# Patient Record
Sex: MALE | Race: WHITE | NOT HISPANIC OR LATINO | Employment: FULL TIME | URBAN - METROPOLITAN AREA
[De-identification: names, ages, dates, MRNs, and addresses within clinical notes are randomized per-mention and may not be internally consistent; named-entity substitution may affect disease eponyms.]

---

## 2022-06-30 ENCOUNTER — LAB REQUISITION (OUTPATIENT)
Dept: LAB | Facility: CLINIC | Age: 32
End: 2022-06-30

## 2022-06-30 PROCEDURE — 86481 TB AG RESPONSE T-CELL SUSP: CPT | Performed by: INTERNAL MEDICINE

## 2022-07-02 LAB
GAMMA INTERFERON BACKGROUND BLD IA-ACNC: 0 IU/ML
M TB IFN-G BLD-IMP: NEGATIVE
M TB IFN-G CD4+ BCKGRND COR BLD-ACNC: 10 IU/ML
MITOGEN IGNF BCKGRD COR BLD-ACNC: 0.03 IU/ML
MITOGEN IGNF BCKGRD COR BLD-ACNC: 0.05 IU/ML
QUANTIFERON MITOGEN: 10 IU/ML
QUANTIFERON NIL TUBE: 0 IU/ML
QUANTIFERON TB1 TUBE: 0.05 IU/ML
QUANTIFERON TB2 TUBE: 0.03

## 2022-07-27 ENCOUNTER — VIRTUAL VISIT (OUTPATIENT)
Dept: FAMILY MEDICINE | Facility: CLINIC | Age: 32
End: 2022-07-27
Payer: COMMERCIAL

## 2022-07-27 DIAGNOSIS — Z72.51 HIGH RISK SEXUAL BEHAVIOR, UNSPECIFIED TYPE: ICD-10-CM

## 2022-07-27 DIAGNOSIS — M25.542 ARTHRALGIA OF BOTH HANDS: Primary | ICD-10-CM

## 2022-07-27 DIAGNOSIS — M25.541 ARTHRALGIA OF BOTH HANDS: Primary | ICD-10-CM

## 2022-07-27 DIAGNOSIS — M76.60 ACHILLES TENDON PAIN: ICD-10-CM

## 2022-07-27 PROCEDURE — 99204 OFFICE O/P NEW MOD 45 MIN: CPT | Mod: 95 | Performed by: INTERNAL MEDICINE

## 2022-07-27 NOTE — PROGRESS NOTES
Leonila is a 32 year old who is being evaluated via a billable video visit.      How would you like to obtain your AVS? MyChart  If the video visit is dropped, the invitation should be resent by: Text to cell phone: [phone]  Will anyone else be joining your video visit? No        Assessment & Plan     Arthralgia of both hands  Check for markers of inflammatory arthritis  If work up negative, consider hand surgery referral   Use topical voltaren for symptoms in interim   - Rheumatoid factor; Future  - Cyclic Citrullinated Peptide Antibody IgG; Future  - ESR: Erythrocyte sedimentation rate; Future  - CRP, inflammation; Future  - Comprehensive metabolic panel (BMP + Alb, Alk Phos, ALT, AST, Total. Bili, TP); Future  - CBC with platelets; Future    Achilles tendon pain  referral to sports medicine     High risk sexual behavior, unspecified type  Check labs and restart Truvada pending review  - Comprehensive metabolic panel (BMP + Alb, Alk Phos, ALT, AST, Total. Bili, TP); Future  - HIV Antigen Antibody Combo; Future  - Hepatitis C Screen Reflex to HCV RNA Quant and Genotype; Future  - Treponema Abs w Reflex to RPR and Titer; Future  - NEISSERIA GONORRHOEA PCR; Future  - CHLAMYDIA TRACHOMATIS PCR; Future  - Orthopedic  Referral; Future      35 minutes spent on the date of the encounter doing chart review, history and exam, documentation and further activities per the note           No follow-ups on file.    Sergey Hardin MD  [facility] NELL Keen is a 32 year old, presenting for the following health issues:  Pain      History of Present Illness       Reason for visit:  Pain in finger joints  Symptom onset:  More than a month  Symptoms include:  Episodic joint pain  Symptom intensity:  Mild  Symptom progression:  Staying the same  Had these symptoms before:  No    He eats 2-3 servings of fruits and vegetables daily.He consumes 0 sweetened beverage(s) daily.He exercises with  enough effort to increase his heart rate 30 to 60 minutes per day.  He exercises with enough effort to increase his heart rate 6 days per week.   He is taking medications regularly.         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  He is working at the [employer].  He has multiple complaints.  His first complaint is that of finger joint stiffness and pain that has been present since at least 2019.  He is quite concerned about this because he worries that it may interfere with his surgery if it progresses.  He describes at least an hour of morning stiffness and improvement with activity.  He also has a very sore Achilles tendon that stays persistently sore despite backing off on running.  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  He also swims for exercise.  Next he asks for STI screening and to resume PrEP.  He has previously tolerated Truvada without issues.    Review of Systems         Objective           Vitals:  No vitals were obtained today due to virtual visit.    Physical Exam   GENERAL: Healthy, alert and no distress  EYES: Eyes grossly normal to inspection.  No discharge or erythema, or obvious scleral/conjunctival abnormalities.  RESP: No audible wheeze, cough, or visible cyanosis.  No visible retractions or increased work of breathing.    SKIN: Visible skin clear. No significant rash, abnormal pigmentation or lesions.  NEURO: Cranial nerves grossly intact.  Mentation and speech appropriate for age.  PSYCH: Mentation appears normal, affect normal/bright, judgement and insight intact, normal speech and appearance well-groomed.                Video-Visit Details    Video Start Time: 4:43 PM    Type of service:  Video Visit    Video End Time:5:07 PM    Originating Location (pt. Location): Home    Distant Location (provider location):  [facility]     Platform used for Video Visit: [email or web address]  Margo

## 2022-07-29 ENCOUNTER — LAB (OUTPATIENT)
Dept: LAB | Facility: CLINIC | Age: 32
End: 2022-07-29
Payer: COMMERCIAL

## 2022-07-29 DIAGNOSIS — M25.542 ARTHRALGIA OF BOTH HANDS: ICD-10-CM

## 2022-07-29 DIAGNOSIS — M25.541 ARTHRALGIA OF BOTH HANDS: ICD-10-CM

## 2022-07-29 DIAGNOSIS — Z72.51 HIGH RISK SEXUAL BEHAVIOR, UNSPECIFIED TYPE: ICD-10-CM

## 2022-07-29 LAB
ALBUMIN SERPL-MCNC: 4.4 G/DL (ref 3.4–5)
ALP SERPL-CCNC: 54 U/L (ref 40–150)
ALT SERPL W P-5'-P-CCNC: 20 U/L (ref 0–70)
ANION GAP SERPL CALCULATED.3IONS-SCNC: 8 MMOL/L (ref 3–14)
AST SERPL W P-5'-P-CCNC: 23 U/L (ref 0–45)
BILIRUB SERPL-MCNC: 0.7 MG/DL (ref 0.2–1.3)
BUN SERPL-MCNC: 10 MG/DL (ref 7–30)
CALCIUM SERPL-MCNC: 8.7 MG/DL (ref 8.5–10.1)
CHLORIDE BLD-SCNC: 106 MMOL/L (ref 94–109)
CO2 SERPL-SCNC: 30 MMOL/L (ref 20–32)
CREAT SERPL-MCNC: 0.94 MG/DL (ref 0.66–1.25)
CRP SERPL-MCNC: <2.9 MG/L (ref 0–8)
ERYTHROCYTE [DISTWIDTH] IN BLOOD BY AUTOMATED COUNT: 12.6 % (ref 10–15)
ERYTHROCYTE [SEDIMENTATION RATE] IN BLOOD BY WESTERGREN METHOD: 5 MM/HR (ref 0–15)
GFR SERPL CREATININE-BSD FRML MDRD: >90 ML/MIN/1.73M2
GLUCOSE BLD-MCNC: 80 MG/DL (ref 70–99)
HCT VFR BLD AUTO: 40.3 % (ref 40–53)
HCV AB SERPL QL IA: NONREACTIVE
HGB BLD-MCNC: 13.8 G/DL (ref 13.3–17.7)
HIV 1+2 AB+HIV1 P24 AG SERPL QL IA: NONREACTIVE
MCH RBC QN AUTO: 31.4 PG (ref 26.5–33)
MCHC RBC AUTO-ENTMCNC: 34.2 G/DL (ref 31.5–36.5)
MCV RBC AUTO: 92 FL (ref 78–100)
PLATELET # BLD AUTO: 195 10E3/UL (ref 150–450)
POTASSIUM BLD-SCNC: 4.2 MMOL/L (ref 3.4–5.3)
PROT SERPL-MCNC: 7.4 G/DL (ref 6.8–8.8)
RBC # BLD AUTO: 4.39 10E6/UL (ref 4.4–5.9)
SODIUM SERPL-SCNC: 144 MMOL/L (ref 133–144)
T PALLIDUM AB SER QL: NONREACTIVE
WBC # BLD AUTO: 3.8 10E3/UL (ref 4–11)

## 2022-07-29 PROCEDURE — 86431 RHEUMATOID FACTOR QUANT: CPT | Mod: 90 | Performed by: PATHOLOGY

## 2022-07-29 PROCEDURE — 85652 RBC SED RATE AUTOMATED: CPT | Performed by: PATHOLOGY

## 2022-07-29 PROCEDURE — 99000 SPECIMEN HANDLING OFFICE-LAB: CPT | Performed by: PATHOLOGY

## 2022-07-29 PROCEDURE — 85027 COMPLETE CBC AUTOMATED: CPT | Performed by: PATHOLOGY

## 2022-07-29 PROCEDURE — 86780 TREPONEMA PALLIDUM: CPT | Mod: 90 | Performed by: PATHOLOGY

## 2022-07-29 PROCEDURE — 87389 HIV-1 AG W/HIV-1&-2 AB AG IA: CPT | Mod: 90 | Performed by: PATHOLOGY

## 2022-07-29 PROCEDURE — 87591 N.GONORRHOEAE DNA AMP PROB: CPT | Mod: 90 | Performed by: PATHOLOGY

## 2022-07-29 PROCEDURE — 86200 CCP ANTIBODY: CPT | Mod: 90 | Performed by: PATHOLOGY

## 2022-07-29 PROCEDURE — 86140 C-REACTIVE PROTEIN: CPT | Performed by: PATHOLOGY

## 2022-07-29 PROCEDURE — 80053 COMPREHEN METABOLIC PANEL: CPT | Performed by: PATHOLOGY

## 2022-07-29 PROCEDURE — 36415 COLL VENOUS BLD VENIPUNCTURE: CPT | Performed by: PATHOLOGY

## 2022-07-29 PROCEDURE — 86803 HEPATITIS C AB TEST: CPT | Mod: 90 | Performed by: PATHOLOGY

## 2022-07-29 PROCEDURE — 87491 CHLMYD TRACH DNA AMP PROBE: CPT | Mod: 90 | Performed by: PATHOLOGY

## 2022-07-29 NOTE — LETTER
[place] [address] [facility]  [address]  [place]  [address]  Tel: [phone]    August 2, 2022    Praneeth Orozco  [address]   [facility] [address]        Dear Mr. Orozco,    The following letter pertains to your most recent diagnostic tests:     Good news! The lab tests to investigate for risk for inflammatory arthritis returned negative.   STI screening is negative.  Liver, kidney and electrolyte tests are normal and the CBC is normal.       Bottom line:  These are healthy and normal lab results.       I sent an prescription for Truvada to your pharmacy.       I think you should see a hand surgeon regarding your persistent hand pains.  I would recommend Dr. Iona Boyer at [facility].  [phone].       I am still working on having someone call you to schedule a face to face visit me            Dr. Hardin/[facility]    Enclosure: Lab Results  Results for orders placed or performed in visit on 07/29/22   Rheumatoid factor     Status: Normal   Result Value Ref Range    Rheumatoid Factor <6 <12 IU/mL   Cyclic Citrullinated Peptide Antibody IgG     Status: Normal   Result Value Ref Range    Cyclic Citrullinated Peptide Antibody IgG 1.8 <7.0 U/mL   ESR: Erythrocyte sedimentation rate     Status: Normal   Result Value Ref Range    Erythrocyte Sedimentation Rate 5 0 - 15 mm/hr   CRP, inflammation     Status: Normal   Result Value Ref Range    CRP Inflammation <2.9 0.0 - 8.0 mg/L   Comprehensive metabolic panel (BMP + Alb, Alk Phos, ALT, AST, Total. Bili, TP)     Status: Normal   Result Value Ref Range    Sodium 144 133 - 144 mmol/L    Potassium 4.2 3.4 - 5.3 mmol/L    Chloride 106 94 - 109 mmol/L    Carbon Dioxide (CO2) 30 20 - 32 mmol/L    Anion Gap 8 3 - 14 mmol/L    Urea Nitrogen 10 7 - 30 mg/dL    Creatinine 0.94 0.66 - 1.25 mg/dL    Calcium 8.7 8.5 - 10.1 mg/dL    Glucose 80 70 - 99 mg/dL    Alkaline Phosphatase 54 40 - 150 U/L    AST 23 0 - 45 U/L    ALT 20 0 -  70 U/L    Protein Total 7.4 6.8 - 8.8 g/dL    Albumin 4.4 3.4 - 5.0 g/dL    Bilirubin Total 0.7 0.2 - 1.3 mg/dL    GFR Estimate >90 >60 mL/min/1.73m2   CBC with platelets     Status: Abnormal   Result Value Ref Range    WBC Count 3.8 (L) 4.0 - 11.0 10e3/uL    RBC Count 4.39 (L) 4.40 - 5.90 10e6/uL    Hemoglobin 13.8 13.3 - 17.7 g/dL    Hematocrit 40.3 40.0 - 53.0 %    MCV 92 78 - 100 fL    MCH 31.4 26.5 - 33.0 pg    MCHC 34.2 31.5 - 36.5 g/dL    RDW 12.6 10.0 - 15.0 %    Platelet Count 195 150 - 450 10e3/uL   HIV Antigen Antibody Combo     Status: Normal   Result Value Ref Range    HIV Antigen Antibody Combo Nonreactive Nonreactive   Hepatitis C Screen Reflex to HCV RNA Quant and Genotype     Status: Normal   Result Value Ref Range    Hepatitis C Antibody Nonreactive Nonreactive    Narrative    Assay performance characteristics have not been established for newborns, infants, and children.   Treponema Abs w Reflex to RPR and Titer     Status: Normal   Result Value Ref Range    Treponema Antibody Total Nonreactive Nonreactive   NEISSERIA GONORRHOEA PCR     Status: Normal    Specimen: Urine, Voided   Result Value Ref Range    Neisseria gonorrhoeae Negative Negative   CHLAMYDIA TRACHOMATIS PCR     Status: Normal    Specimen: Urine, Voided   Result Value Ref Range    Chlamydia trachomatis Negative Negative

## 2022-07-30 LAB
C TRACH DNA SPEC QL NAA+PROBE: NEGATIVE
N GONORRHOEA DNA SPEC QL NAA+PROBE: NEGATIVE

## 2022-08-01 LAB
CCP AB SER IA-ACNC: 1.8 U/ML
RHEUMATOID FACT SER NEPH-ACNC: <6 IU/ML

## 2022-08-01 RX ORDER — EMTRICITABINE AND TENOFOVIR DISOPROXIL FUMARATE 200; 300 MG/1; MG/1
1 TABLET, FILM COATED ORAL DAILY
Qty: 90 TABLET | Refills: 1 | Status: SHIPPED | OUTPATIENT
Start: 2022-08-01 | End: 2022-12-16

## 2022-08-01 NOTE — RESULT ENCOUNTER NOTE
Can you call this patient and arrange for F2F appointment with Dr. Hardin, same day or virtual slot OK

## 2022-08-01 NOTE — RESULT ENCOUNTER NOTE
The following letter pertains to your most recent diagnostic tests:    Good news! The lab tests to investigate for risk for inflammatory arthritis returned negative.   STI screening is negative.  Liver, kidney and electrolyte tests are normal and the CBC is normal.      Bottom line:  These are healthy and normal lab results.      I sent an prescription for Truvada to your pharmacy.      I think you should see a hand surgeon regarding your persistent hand pains.  I would recommend Dr. Iona Boyer at [facility].  [phone].      I am still working on having someone call you to schedule a face to face visit me        Sincerely,    Dr. Hardin

## 2022-08-02 ENCOUNTER — MYC MEDICAL ADVICE (OUTPATIENT)
Dept: FAMILY MEDICINE | Facility: CLINIC | Age: 32
End: 2022-08-02

## 2022-08-02 DIAGNOSIS — M76.60 ACHILLES TENDON PAIN: ICD-10-CM

## 2022-08-02 DIAGNOSIS — M54.89 MIDLINE BACK PAIN, UNSPECIFIED BACK LOCATION, UNSPECIFIED CHRONICITY: ICD-10-CM

## 2022-08-02 DIAGNOSIS — M25.542 ARTHRALGIA OF BOTH HANDS: Primary | ICD-10-CM

## 2022-08-02 DIAGNOSIS — M25.541 ARTHRALGIA OF BOTH HANDS: Primary | ICD-10-CM

## 2022-08-03 NOTE — TELEPHONE ENCOUNTER
Please see patient's mychart:    patient requesting to be tested for  [id number], per lab result note patient was advised to follow up with hand surgery.     Writer unsure if patient discussed back pain during visit?       Please reply back to patient, route to triage follow up, or route to team coordinators to have patient schedule an appointment.     Kelly Javed RN  [facility]

## 2022-08-04 NOTE — TELEPHONE ENCOUNTER
DIAGNOSIS: R Achilles tendon pain/Sergey Hardin MD in CS   APPOINTMENT DATE: 8.16.22   NOTES STATUS DETAILS   OFFICE NOTE from referring provider Internal 7.27.22 Dr Sergey Hardin, [facility]   MEDICATION LIST Internal

## 2022-08-11 ENCOUNTER — OFFICE VISIT (OUTPATIENT)
Dept: FAMILY MEDICINE | Facility: CLINIC | Age: 32
End: 2022-08-11
Payer: COMMERCIAL

## 2022-08-11 VITALS
SYSTOLIC BLOOD PRESSURE: 113 MMHG | BODY MASS INDEX: 22.75 KG/M2 | OXYGEN SATURATION: 98 % | WEIGHT: 158.9 LBS | HEIGHT: 70 IN | RESPIRATION RATE: 16 BRPM | HEART RATE: 81 BPM | TEMPERATURE: 98.7 F | DIASTOLIC BLOOD PRESSURE: 65 MMHG

## 2022-08-11 DIAGNOSIS — M76.60 ACHILLES TENDON PAIN: ICD-10-CM

## 2022-08-11 DIAGNOSIS — M54.89 MIDLINE BACK PAIN, UNSPECIFIED BACK LOCATION, UNSPECIFIED CHRONICITY: ICD-10-CM

## 2022-08-11 DIAGNOSIS — Z13.220 LIPID SCREENING: ICD-10-CM

## 2022-08-11 DIAGNOSIS — Z72.51 HIGH RISK SEXUAL BEHAVIOR, UNSPECIFIED TYPE: ICD-10-CM

## 2022-08-11 DIAGNOSIS — K29.70 GASTRITIS WITHOUT BLEEDING, UNSPECIFIED CHRONICITY, UNSPECIFIED GASTRITIS TYPE: ICD-10-CM

## 2022-08-11 DIAGNOSIS — M25.542 ARTHRALGIA OF BOTH HANDS: Primary | ICD-10-CM

## 2022-08-11 DIAGNOSIS — M25.541 ARTHRALGIA OF BOTH HANDS: Primary | ICD-10-CM

## 2022-08-11 PROCEDURE — 99214 OFFICE O/P EST MOD 30 MIN: CPT | Performed by: INTERNAL MEDICINE

## 2022-08-11 RX ORDER — MELOXICAM 7.5 MG/1
7.5 TABLET ORAL DAILY PRN
Qty: 90 TABLET | Refills: 1 | Status: SHIPPED | OUTPATIENT
Start: 2022-08-11 | End: 2023-06-16

## 2022-08-11 ASSESSMENT — PAIN SCALES - GENERAL: PAINLEVEL: NO PAIN (1)

## 2022-08-11 NOTE — PROGRESS NOTES
Assessment & Plan     Arthralgia of both hands      Achilles tendon pain      Midline back pain, unspecified back location, unspecified chronicity    - meloxicam (MOBIC) 7.5 MG tablet; Take 1 tablet (7.5 mg) by mouth daily as needed    Gastritis without bleeding, unspecified chronicity, unspecified gastritis type    - omeprazole (PRILOSEC) 20 MG DR capsule; Take 1 capsule (20 mg) by mouth daily    Lipid screening    - Lipid panel reflex to direct LDL Fasting; Future        It is not entirely clear what may be causing his symptoms  He has an appointment to see rheumatology later this month and sports medicine for his Achilles next week  Until then he can use meloxicam and concomitant PPI temporarily for symptoms   Risk and side effects of both drugs discussed       35 minutes spent on the date of the encounter doing chart review, history and exam, documentation and further activities per the note           No follow-ups on file.    Sergey Hardin MD  [facility] NELL Keen is a 32 year old, presenting for the following health issues:  Musculoskeletal Problem      History of Present Illness       Reason for visit:  Hand painHe consumes 0 sweetened beverage(s) daily.He exercises with enough effort to increase his heart rate 30 to 60 minutes per day.  He exercises with enough effort to increase his heart rate 5 days per week.   He is taking medications regularly.       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      Objective    /65 (BP Location: Left arm, Patient  "Position: Sitting, Cuff Size: Adult Regular)   Pulse 81   Temp 98.7  F (37.1  C) (Temporal)   Resp 16   Ht 1.778 m (5' 10\")   Wt 72.1 kg (158 lb 14.4 oz)   SpO2 98%   BMI 22.80 kg/m    Body mass index is 22.8 kg/m .  Physical Exam   GENERAL: healthy, alert and no distress  EYES: Eyes grossly normal to inspection, PERRL and conjunctivae and sclerae normal  HENT: ear canals and TM's normal, nose and mouth without ulcers or lesions  NECK: no adenopathy, no asymmetry, masses, or scars and thyroid normal to palpation  RESP: lungs clear to auscultation - no rales, rhonchi or wheezes  CV: regular rate and rhythm, normal S1 S2, no S3 or S4, no murmur, click or rub, no peripheral edema and peripheral pulses strong  ABDOMEN: soft, nontender, no hepatosplenomegaly, no masses and bowel sounds normal  MS: no gross musculoskeletal defects noted, no edema; nothing to suggest Achilles tendon rupture; lower back seems flexible nothing to suggest ankylosing spondylitis negative schober's test  SKIN: no suspicious lesions or rashes  NEURO: Normal strength and tone, mentation intact and speech normal  PSYCH: mentation appears normal, affect normal/bright                    .  ..  "

## 2022-08-16 ENCOUNTER — PRE VISIT (OUTPATIENT)
Dept: ORTHOPEDICS | Facility: CLINIC | Age: 32
End: 2022-08-16

## 2022-08-30 ENCOUNTER — OFFICE VISIT (OUTPATIENT)
Dept: ORTHOPEDICS | Facility: CLINIC | Age: 32
End: 2022-08-30
Attending: INTERNAL MEDICINE
Payer: COMMERCIAL

## 2022-08-30 DIAGNOSIS — M76.60 ACHILLES TENDON PAIN: ICD-10-CM

## 2022-08-30 PROCEDURE — 99203 OFFICE O/P NEW LOW 30 MIN: CPT | Performed by: FAMILY MEDICINE

## 2022-08-30 NOTE — PROGRESS NOTES
[facility] CLINICS AND SURGERY CENTER  SPORTS & ORTHOPEDIC CLINIC VISIT     Aug 30, 2022        ASSESSMENT & PLAN    32-year-old with suspected insertional Achilles tendinopathy    Reviewed imaging and assessment with patient in detail  Briefly discussed for modifications and trial of a heel cup.  Provided with home exercises and referred to physical therapy and strongly encourage PT follow-up.  If he is not improving in the next 4 to 6 weeks would recommend follow-up for reevaluation.    Evgeny Frazier MD  [facility] SPORTS MEDICINE CLINIC [place]    -----  Chief Complaint   Patient presents with     Right Lower Leg - Pain       SUBJECTIVE  Praneeth Orozco is a/an 32 year old male who is seen in consultation at the request of  Sergey Hardin M.D. for evaluation of  Right achilles tendon pain.     The patient is seen by themselves.  The patient is Right handed    Onset: 8 month(s) ago. 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  Location of Pain: right achilles   Worsened by: Running, stairs,   Better with: NA   Treatments tried: Stretch, ibuprofen, ice  Associated symptoms: no distal numbness or tingling; denies swelling or warmth    Orthopedic/Surgical history: NO  Social History/Occupation: urology fellow      REVIEW OF SYSTEMS:    Do you have fever, chills, weight loss? No    Do you have any vision problems? No    Do you have any chest pain or edema? No    Do you have any shortness of breath or wheezing?  No    Do you have stomach problems? No    Do you have any numbness or focal weakness? No    Do you have diabetes? No    Do you have problems with bleeding or clotting? No    Do you have an rashes or other skin lesions? No    OBJECTIVE:  There were no vitals taken for this visit.     General: Alert, pleasant, no distress  Right ankle: warm, well perfused, SILT throughout     Inspection: No swelling ecchymosis or deformity     ROM: Symmetric intact  without pain     Strength: Intact without pain     Palpation: TTP over the distal one third of the Achilles including at the insertion.  No TTP of the remainder of the Achilles tendon or musculocutaneous junction.  No tenderness in the retrocalcaneal area     Special Tests: None      RADIOLOGY:    No imaging this visit

## 2022-08-30 NOTE — LETTER
8/30/2022      RE: Praneeth Orozco  [address]  [facility] [address]     Dear Colleague,    Thank you for referring your patient, Praneeth Orozco, to the [facility] SPORTS MEDICINE [facility]. Please see a copy of my visit note below.      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  Provided with home exercises and referred to physical therapy and strongly encourage PT follow-up.  If he is not improving in the next 4 to 6 weeks would recommend follow-up for reevaluation.    Evgeny Frazier MD  [facility] SP[facility]TS [facility]IC[facility]    -----  Chief Complaint   Patient presents with     Right Lower Leg - Pain       SUBJECTIVE  Praneeth Orozco is a/an 32 year old male who is seen in consultation at the request of  Sergey Hardin M.D. for evaluation of  Right achilles tendon pain.     The patient is seen by themselves.  The patient is Right handed    Onset: 8 month(s) ago. 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  Location of Pain: right achilles   Worsened by: Running, stairs,   Better with: NA   Treatments tried: Stretch, ibuprofen, ice  Associated symptoms: no distal numbness or tingling; denies swelling or warmth    Orthopedic/Surgical history: NO  Social History/Occupation: urology fellow      REVIEW OF SYSTEMS:    Do you have fever, chills, weight loss? No    Do you have any vision problems? No    Do you have any chest pain or edema? No    Do you have any shortness of breath or wheezing?  No    Do you have stomach problems? No    Do you have any numbness or focal weakness? No    Do you have diabetes? No    Do you have problems with bleeding or clotting? No    Do  you have an rashes or other skin lesions? No    OBJECTIVE:  There were no vitals taken for this visit.     General: Alert, pleasant, no distress  Right ankle: warm, well perfused, SILT throughout     Inspection: No swelling ecchymosis or deformity     ROM: Symmetric intact without pain     Strength: Intact without pain     Palpation: TTP over the distal one third of the Achilles including at the insertion.  No TTP of the remainder of the Achilles tendon or musculocutaneous junction.  No tenderness in the retrocalcaneal area     Special Tests: None      RADIOLOGY:    No imaging this visit      Again, thank you for allowing me to participate in the care of your patient.      Sincerely,    Evgeny Frazier MD

## 2022-09-06 ENCOUNTER — TRANSFERRED RECORDS (OUTPATIENT)
Dept: FAMILY MEDICINE | Facility: CLINIC | Age: 32
End: 2022-09-06

## 2022-09-20 ENCOUNTER — THERAPY VISIT (OUTPATIENT)
Dept: PHYSICAL THERAPY | Facility: CLINIC | Age: 32
End: 2022-09-20
Attending: FAMILY MEDICINE
Payer: COMMERCIAL

## 2022-09-20 DIAGNOSIS — M76.60 ACHILLES TENDON PAIN: ICD-10-CM

## 2022-09-20 PROCEDURE — 97110 THERAPEUTIC EXERCISES: CPT | Mod: GP | Performed by: PHYSICAL THERAPIST

## 2022-09-20 PROCEDURE — 97161 PT EVAL LOW COMPLEX 20 MIN: CPT | Mod: GP | Performed by: PHYSICAL THERAPIST

## 2022-09-20 PROCEDURE — 97530 THERAPEUTIC ACTIVITIES: CPT | Mod: GP | Performed by: PHYSICAL THERAPIST

## 2022-09-20 NOTE — PROGRESS NOTES
Physical Therapy Initial Evaluation  Subjective:  [facility]  Physical Therapy Initial Examination/Evaluation  September 20, 2022    Praneeth Orozco is a 32 year old  male referred to physical therapy by Dr. Frazier for treatment of achilles pain with Precautions/Restrictions/MD instructions none    KEY PT FINDINGS:  1.  184 bpm running heidi  2.  Midsubstance and insertional achilles tendinopathy  3.  Excellent SL squat [facility]    Subjective:  Referring MD visit date: 8/30/22  DOI/onset: January 2022  Mechanism of injury: Running  DOS none  Previous treatment: eccentric, stretching  Imaging: xray  Chief Complaint:   Pain with running, walking stairs, unable to push off wall on R swimming   Pain: best 0 /10, worst 2/10 achilles to insertion Described as: aching Alleviated by: rest Frequency: intermittent Progression of symptoms since initial onset: improving Time of day when pain is worse: not related  Sleeping: not affected  Social history:  From [place]    Occupation: urulogy fellow  Job duties:  Sitting, standing    Current HEP/exercise regimen: swimming, weightlifting  Patient's goals are return to marathon, ironman    Pertinent PMH: none    General Health Reported by Patient: excellent  Return to MD:  PRN     Old shoes - zaunte 6 mm drop  New shoes - kinvara 4 mm drop      Objective:  Standing Alignment:                Ankle/Foot:  Pes planus L and pes planus R              Ankle/Foot Evaluation  ROM:  AROM is normal.      Strength is normal.                                                              General     ROS    2D Video Running Gait Analysis   Reproduction of  [facility] 2D Video Analysis    Nitesh Bennett PT, PhD 2015     SAGITTAL PLANE OBSERVATIONS  Variable Right Left   Foot Strike Pattern Mid-foot Mid-foot   IC Tibial Inclination Angle (within 5  of vertical) Mild inclination Mild inclination   IC KF Angle (~20 ) Mild decrease  Mild decrease   MS KF Angle (~40 ) Appropriate Appropriate   MS Ankle DF Angle   (knee over toes) Appropriate Appropriate   Push-off Hip Ext Angle (0-5 ) Appropriate Appropriate   Anterior Pelvic Tilt (5-10 ) Appropriate Appropriate   Lumbar Lordosis Appropriate Appropriate   COM Excursion (6-8 cm) Appropriate Appropriate     Forward Trunk Lean (5-10  forward) Appropriate       FRONTAL PLANE OBSERVATIONS  Variable Right Left   Trunk Side Bend (vertical) Appropriate Appropriate   Lateral Pelvic Drop   (males 3-5 , females 4-7 ) Appropriate Appropriate   Knee Center Position (midline) Appropriate Appropriate   Knee Separation   (slight separation) Appropriate Appropriate   Foot-COM Position   (speed dependent) Appropriate Appropriate   Rearfoot Position   (neutral or mild pronation) Appropriate Appropriate   Forefoot Position   (neutral or mild abduction) Appropriate Appropriate     Other Observations  Farrah 184 bpm   Footwear saucony kinvara    Trunk Rotation Appropriate   Arm Swing Excessive abduction   Heel Height (symmetrical) Appropriate     Assessment/Plan      Patient is a 32 year old male with right side ankle complaints.    Patient has the following significant findings with corresponding treatment plan.                Diagnosis 1:  Achilles tendinopathy    Pain -  hot/cold therapy, US, electric stimulation, manual therapy, splint/taping/bracing/orthotics, self management, education, and home program  Decreased ROM/flexibility - manual therapy and therapeutic exercise  Decreased joint mobility - manual therapy and therapeutic exercise  Decreased strength - therapeutic exercise and therapeutic activities  Impaired balance - neuro re-education and therapeutic activities  Decreased proprioception - neuro re-education and therapeutic activities  Inflammation - cold therapy  Edema - vasopneumatics  Impaired gait - gait training  Impaired muscle performance - neuro re-education  Decreased function - therapeutic  activities    Therapy Evaluation Codes:   1) History comprised of:   Personal factors that impact the plan of care:      None.    Comorbidity factors that impact the plan of care are:      None.     Medications impacting care: None.  2) Examination of Body Systems comprised of:   Body structures and functions that impact the plan of care:      Ankle.   Activity limitations that impact the plan of care are:      Running, Sports, Squatting/kneeling, Stairs and Walking.  3) Clinical presentation characteristics are:   Stable/Uncomplicated.  4) Decision-Making    Low complexity using standardized patient assessment instrument and/or measureable assessment of functional outcome.  Cumulative Therapy Evaluation is: Low complexity.    Previous and current functional limitations:  (See Goal Flow Sheet for this information)    Short term and Long term goals: (See Goal Flow Sheet for this information)     Communication ability:  Patient appears to be able to clearly communicate and understand verbal and written communication and follow directions correctly.  Treatment Explanation - The following has been discussed with the patient:   RX ordered/plan of care  Anticipated outcomes  Possible risks and side effects  This patient would benefit from PT intervention to resume normal activities.   Rehab potential is good.    Frequency:  2 X month, once daily  Duration:  for 3 months  Discharge Plan:  Achieve all LTG.  Independent in home treatment program.  Reach maximal therapeutic benefit.    Please refer to the daily flowsheet for treatment today, total treatment time and time spent performing 1:1 timed codes.

## 2022-10-03 ENCOUNTER — HEALTH MAINTENANCE LETTER (OUTPATIENT)
Age: 32
End: 2022-10-03

## 2022-10-12 ENCOUNTER — THERAPY VISIT (OUTPATIENT)
Dept: PHYSICAL THERAPY | Facility: CLINIC | Age: 32
End: 2022-10-12
Payer: COMMERCIAL

## 2022-10-12 DIAGNOSIS — M76.60 ACHILLES TENDON PAIN: Primary | ICD-10-CM

## 2022-10-12 PROCEDURE — 97530 THERAPEUTIC ACTIVITIES: CPT | Mod: GP | Performed by: PHYSICAL THERAPIST

## 2022-10-12 PROCEDURE — 97110 THERAPEUTIC EXERCISES: CPT | Mod: GP | Performed by: PHYSICAL THERAPIST

## 2022-10-17 DIAGNOSIS — M25.511 RIGHT SHOULDER PAIN: Primary | ICD-10-CM

## 2022-10-18 ENCOUNTER — OFFICE VISIT (OUTPATIENT)
Dept: ORTHOPEDICS | Facility: CLINIC | Age: 32
End: 2022-10-18
Payer: COMMERCIAL

## 2022-10-18 ENCOUNTER — ANCILLARY PROCEDURE (OUTPATIENT)
Dept: GENERAL RADIOLOGY | Facility: CLINIC | Age: 32
End: 2022-10-18
Attending: FAMILY MEDICINE
Payer: COMMERCIAL

## 2022-10-18 DIAGNOSIS — M25.511 PAIN IN RIGHT ACROMIOCLAVICULAR JOINT: Primary | ICD-10-CM

## 2022-10-18 PROCEDURE — 99214 OFFICE O/P EST MOD 30 MIN: CPT | Performed by: FAMILY MEDICINE

## 2022-10-18 PROCEDURE — 73030 X-RAY EXAM OF SHOULDER: CPT | Mod: RT | Performed by: RADIOLOGY

## 2022-10-18 NOTE — LETTER
10/18/2022      RE: Praneeth Orozco  [address]  [facility] [address]     Dear Colleague,    Thank you for referring your patient, Praneeth Orozco, to the [facility] SPORTS MEDICINE [facility]. Please see a copy of my visit note below.      [facility] SUR[facility]ERY CENTER  SPORTS & ORTHOPEDIC CLINIC VISIT     Oct 18, 2022        ASSESSMENT & PLAN    32-year-old with right shoulder pain which localizes to the AC joint in the setting of findings of osteolysis on exam suggestive of repetitive use injury to the AC joint.    Reviewed imaging and assessment with patient in detail  Discussed activity modifications and avoidance of repetitive activity.  We also discussed knee possibility for steroid injection and he is interested in pursuing this option.  He will be assisted with scheduling a follow-up in clinic to have this procedure performed.  We also briefly discussed pain that he has been having with pronation supination of the elbow that stems from the lateral elbow.  Possibly lateral epicondylitis versus radial nerve irritation in the supinator.  Was provided with home exercises to start.  We will continue to follow.    Evgeny Frazier MD  [facility] SPORTS MEDICINE [facility]    -----  Chief Complaint   Patient presents with     Right Shoulder - Pain       SUBJECTIVE  Praneeth Orozco is a/an 32 year old male who is seen as a self referral for evaluation of  Right AC joint pain.     The patient is seen by themselves.  The patient is Right handed    Onset: 6 week(s) ago. Patient describes injury as having pain at the AC joint with swimming and weight lifting.   Location of Pain: right AC joint   Worsened by: Swimming, and Weight lifting and sleeping on it   Better with: Ibuprofen and rest   Treatments tried: rest/activity avoidance and ibuprofen  Associated symptoms: no distal numbness or tingling; denies swelling or warmth    Orthopedic/Surgical history:  NO  Social History/Occupation: Urology Fellow       REVIEW OF SYSTEMS:    Do you have fever, chills, weight loss? No    Do you have any vision problems? No    Do you have any chest pain or edema? No    Do you have any shortness of breath or wheezing?  No    Do you have stomach problems? No    Do you have any numbness or focal weakness? No    Do you have diabetes? No    Do you have problems with bleeding or clotting? No    Do you have an rashes or other skin lesions? No    OBJECTIVE:  There were no vitals taken for this visit.     EXAM:  Alert, pleasant and conversational    Neck with full AROM, non-tender to midline cervical and upper thoracic spine palpation, negative Spurling's.  Elbow with FROM and non-tender to palpation      right shoulder:   Skin intact. No skin changes, deformity or atrophy    AROM:   Forward Flexion: 180    Abduction: 180    External rotation: ~70    Internal rotation: T7.   symmetric ROM compared to uninjured side  symmetric Scapular motion    Strength testing:   Abduction: 5/5,   External rotation: 5/5   Internal rotation 5/5     Deltoids 5/5, Biceps 5/5, Triceps 5/5,  5/5.    Palpation: positive  TTP of the Acromioclavicular joint  negative TTP of Sternoclavicular joint  negative TTP of posterior glenoid  negative TTP of scapular borders  negative TTP of the bicipital tendon.     Special Tests: negative Phipps test  positive  Neer's test.   positive Caledonia's test   negative Speed's test.    Neurovascularly intact bilateral upper extremities.      RADIOLOGY:    4 view x-rays of the right shoulder performed and reviewed independently demonstrating no acute fracture or dislocation.  No significant glenohumeral DJD.  Slight osteolysis of the distal end of the clavicle at the AC joint.  See EMR for formal radiology report.          Again, thank you for allowing me to participate in the care of your patient.      Sincerely,    Evgeny Frazier MD

## 2022-11-15 ENCOUNTER — OFFICE VISIT (OUTPATIENT)
Dept: ORTHOPEDICS | Facility: CLINIC | Age: 32
End: 2022-11-15
Payer: COMMERCIAL

## 2022-11-15 DIAGNOSIS — M25.511 PAIN IN RIGHT ACROMIOCLAVICULAR JOINT: Primary | ICD-10-CM

## 2022-11-15 PROCEDURE — 20606 DRAIN/INJ JOINT/BURSA W/US: CPT | Mod: RT | Performed by: FAMILY MEDICINE

## 2022-11-15 RX ORDER — TRIAMCINOLONE ACETONIDE 40 MG/ML
40 INJECTION, SUSPENSION INTRA-ARTICULAR; INTRAMUSCULAR
Status: SHIPPED | OUTPATIENT
Start: 2022-11-15

## 2022-11-15 RX ORDER — LIDOCAINE HYDROCHLORIDE 10 MG/ML
1 INJECTION, SOLUTION EPIDURAL; INFILTRATION; INTRACAUDAL; PERINEURAL
Status: SHIPPED | OUTPATIENT
Start: 2022-11-15

## 2022-11-15 RX ADMIN — TRIAMCINOLONE ACETONIDE 40 MG: 40 INJECTION, SUSPENSION INTRA-ARTICULAR; INTRAMUSCULAR at 12:05

## 2022-11-15 RX ADMIN — LIDOCAINE HYDROCHLORIDE 1 ML: 10 INJECTION, SOLUTION EPIDURAL; INFILTRATION; INTRACAUDAL; PERINEURAL at 12:05

## 2022-11-15 NOTE — PROGRESS NOTES
[facility] AND SURGERY CENTER  SPORTS & ORTHOPEDIC CLINIC VISIT     Nov 15, 2022        Ultrasound Guided Right AC joint injection    Date/Time: 11/15/2022 12:05 PM  Performed by: Evgeny Frazier MD  Authorized by: Evgeny Frazier MD     Indications:  Pain  Needle Size:  25 G  Guidance: ultrasound    Approach:  Superior  Location:  Shoulder  Site:  R acromioclavicular  Medications:  40 mg triamcinolone 40 MG/ML; 1 mL lidocaine (PF) 1 %  Outcome:  Tolerated well, no immediate complications  Procedure discussed: discussed risks, benefits, and alternatives    Consent Given by:  Patient  Timeout: timeout called immediately prior to procedure    Prep: patient was prepped and draped in usual sterile fashion     PROCEDURE: Ultrasound Guided Right AC Injection   The patient was apprised of the risks and the benefits of the procedure written consent was signed by the patient.   The patient was positioned seated in a chair.  The superior acromioclavicular joint was identified with ultrasound and marked with a pen.  Ultrasound was utilized to ensure proper placement of medication into the AC joint clear of surrounding neurovascular structures.  This area was then cleaned with a chlorhexidine swab.  Anesthesia of the skin was obtained with ethyl chloride spray.  Next using direct and continuous ultrasound guidance with sterile technique a 25-gauge needle was introduced into the joint and a solution of 40 mg triamcinolone and 1mL 1% lidocaine was injected and seen flowing into the joint space.  Images were captured and saved to the permanent record.  The patient tolerated the procedure well and there were no immediate complications.  Patient was instructed to ice the shoulder upon leaving the clinic and refrain from overuse for the next 2 days.  Follow-up promptly for any increase in pain, swelling, redness or warmth from the injection site.  Otherwise routine postinjection instructions were  given.    Evgeny Frazier MD

## 2022-11-15 NOTE — LETTER
11/15/2022      RE: Praneeth Orozco  [address]  [facility] [address]     Dear Colleague,    Thank you for referring your patient, Praneeth Orozco, to the [facility] SPORTS MEDICINE CLINIC [place]. Please see a copy of my visit note below.      [facility] CLINICS AND SURGERY CENTER  SPORTS & ORTHOPEDIC CLINIC VISIT     Nov 15, 2022        Ultrasound Guided Right AC joint injection    Date/Time: 11/15/2022 12:05 PM  Performed by: Evgeny Frazier MD  Authorized by: Evgeny Frazier MD     Indications:  Pain  Needle Size:  25 G  Guidance: ultrasound    Approach:  Superior  Location:  Shoulder  Site:  R acromioclavicular  Medications:  40 mg triamcinolone 40 MG/ML; 1 mL lidocaine (PF) 1 %  Outcome:  Tolerated well, no immediate complications  Procedure discussed: discussed risks, benefits, and alternatives    Consent Given by:  Patient  Timeout: timeout called immediately prior to procedure    Prep: patient was prepped and draped in usual sterile fashion     PROCEDURE: Ultrasound Guided Right AC Injection   The patient was apprised of the risks and the benefits of the procedure written consent was signed by the patient.   The patient was positioned seated in a chair.  The superior acromioclavicular joint was identified with ultrasound and marked with a pen.  Ultrasound was utilized to ensure proper placement of medication into the AC joint clear of surrounding neurovascular structures.  This area was then cleaned with a chlorhexidine swab.  Anesthesia of the skin was obtained with ethyl chloride spray.  Next using direct and continuous ultrasound guidance with sterile technique a 25-gauge needle was introduced into the joint and a solution of 40 mg triamcinolone and 1mL 1% lidocaine was injected and seen flowing into the joint space.  Images were captured and saved to the permanent record.  The patient tolerated the procedure well and there were no immediate complications.   Patient was instructed to ice the shoulder upon leaving the clinic and refrain from overuse for the next 2 days.  Follow-up promptly for any increase in pain, swelling, redness or warmth from the injection site.  Otherwise routine postinjection instructions were given.    Evgeny Frazier MD

## 2022-12-14 ENCOUNTER — MYC MEDICAL ADVICE (OUTPATIENT)
Dept: FAMILY MEDICINE | Facility: CLINIC | Age: 32
End: 2022-12-14

## 2022-12-14 DIAGNOSIS — Z11.3 ROUTINE SCREENING FOR STI (SEXUALLY TRANSMITTED INFECTION): Primary | ICD-10-CM

## 2022-12-14 DIAGNOSIS — Z72.51 HIGH RISK SEXUAL BEHAVIOR, UNSPECIFIED TYPE: ICD-10-CM

## 2022-12-16 RX ORDER — EMTRICITABINE AND TENOFOVIR DISOPROXIL FUMARATE 200; 300 MG/1; MG/1
1 TABLET, FILM COATED ORAL DAILY
Qty: 90 TABLET | Refills: 1 | Status: SHIPPED | OUTPATIENT
Start: 2022-12-16 | End: 2023-04-04

## 2022-12-16 NOTE — TELEPHONE ENCOUNTER
See below asking for Truvada Rx, pended    Mae SCHULZ, Triage RN  [facility]

## 2022-12-19 ENCOUNTER — LAB (OUTPATIENT)
Dept: LAB | Facility: CLINIC | Age: 32
End: 2022-12-19
Payer: COMMERCIAL

## 2022-12-19 DIAGNOSIS — Z11.3 ROUTINE SCREENING FOR STI (SEXUALLY TRANSMITTED INFECTION): ICD-10-CM

## 2022-12-19 LAB — HIV 1+2 AB+HIV1 P24 AG SERPL QL IA: NONREACTIVE

## 2022-12-19 PROCEDURE — 87389 HIV-1 AG W/HIV-1&-2 AB AG IA: CPT | Mod: 90 | Performed by: PATHOLOGY

## 2022-12-19 PROCEDURE — 36415 COLL VENOUS BLD VENIPUNCTURE: CPT | Performed by: PATHOLOGY

## 2022-12-19 PROCEDURE — 99000 SPECIMEN HANDLING OFFICE-LAB: CPT | Performed by: PATHOLOGY

## 2022-12-19 PROCEDURE — 87591 N.GONORRHOEAE DNA AMP PROB: CPT | Mod: 90 | Performed by: PATHOLOGY

## 2022-12-19 PROCEDURE — 86780 TREPONEMA PALLIDUM: CPT | Mod: 90 | Performed by: PATHOLOGY

## 2022-12-19 PROCEDURE — 87491 CHLMYD TRACH DNA AMP PROBE: CPT | Mod: 90 | Performed by: PATHOLOGY

## 2022-12-20 LAB
C TRACH DNA SPEC QL NAA+PROBE: NEGATIVE
N GONORRHOEA DNA SPEC QL NAA+PROBE: NEGATIVE
T PALLIDUM AB SER QL: NONREACTIVE

## 2022-12-20 NOTE — RESULT ENCOUNTER NOTE
The following letter pertains to your most recent diagnostic tests:      Good news! STI screening is negative.        Sincerely,    Dr. Hardin

## 2022-12-26 DIAGNOSIS — M25.511 PAIN IN RIGHT ACROMIOCLAVICULAR JOINT: Primary | ICD-10-CM

## 2022-12-28 ENCOUNTER — THERAPY VISIT (OUTPATIENT)
Dept: PHYSICAL THERAPY | Facility: CLINIC | Age: 32
End: 2022-12-28
Payer: COMMERCIAL

## 2022-12-28 DIAGNOSIS — M25.511 SHOULDER PAIN, RIGHT: Primary | ICD-10-CM

## 2022-12-28 PROCEDURE — 97161 PT EVAL LOW COMPLEX 20 MIN: CPT | Mod: GP | Performed by: PHYSICAL THERAPIST

## 2022-12-28 PROCEDURE — 97530 THERAPEUTIC ACTIVITIES: CPT | Mod: GP | Performed by: PHYSICAL THERAPIST

## 2022-12-28 PROCEDURE — 97112 NEUROMUSCULAR REEDUCATION: CPT | Mod: GP | Performed by: PHYSICAL THERAPIST

## 2022-12-28 NOTE — PROGRESS NOTES
[facility] and Surgery Center  Physical Therapy Initial Examination/Evaluation  December 28, 2022    Praneeth Orozco is a 32 year old  male referred to physical therapy by Dr. Frazier for treatment of shoulder pain with Precautions/Restrictions/MD instructions none    KEY PT FINDINGS:  1.  Medial border scapular winging L>R  2.  TTP AC joint  3.  Periscapular mm weakness    Subjective:  DOI/onset: 4-5 months  Mechanism of injury: swimming  DOS none  Previous treatment: CSI - not helpful  Imaging: xray  Chief Complaint:   Pain with swimming, sleeping, reaching out to side   Pain: best 2 /10, worst 3/10 AC Described as: sharp Alleviated by: rest, nsaids Frequency: intermittent Progression of symptoms since initial onset: worsening Time of day when pain is worse: not related  Sleeping: difficult  Social history:  none    Occupation: fellow in urology  Job duties:  Sitting, standing, or    Current HEP/exercise regimen: swimming, skiing XC  Patient's goals are reduce pain    Pertinent PMH: none   General Health Reported by Patient: good  Return to MD:  PRN     SHOULDER EXAMINATION  Cervical/Thoracic Screen: wnl    Static Posture:   Forward head: neg Rounded shoulders:neg Scapular winging: medial border L>R       Shoulder ROM:   AROM  Flexion  Abduction  ER in Neutral Ext/IR    Left  170  170 80 T8   Right  170 170 80 T8       SHOULDER STRENGTH:   MMT  Flexion  Abduction Scaption  ER  IR    Left  5/5  5/5 5/5  5/5  5/5    Right  5-/5  5-/5 5-/5  5-/5  5/5      SCAPULAR STRENGTH:  MMT Middle Trapezius Rhomboids Lower Trapezius   Left 4+/5  4+/5  4/5    Right 4/5  4+/5  4-/5      SPECIAL TESTS:   Left Right   Impingement     Neer's nt -   Hawkin's-Zachery nt -   Coracoid Impingement nt -   AC Joint     Palpation nt +   Cross Body Adduction nt -       Assessment/Plan      Patient is a 32 year old male with right side shoulder complaints.    Patient has the following significant findings with corresponding treatment  plan.                Diagnosis 1:  Shoulder pain    Pain -  hot/cold therapy, US, electric stimulation, manual therapy, splint/taping/bracing/orthotics, self management, education, and home program  Decreased ROM/flexibility - manual therapy and therapeutic exercise  Decreased joint mobility - manual therapy and therapeutic exercise  Decreased strength - therapeutic exercise and therapeutic activities  Impaired balance - neuro re-education and therapeutic activities  Decreased proprioception - neuro re-education and therapeutic activities  Inflammation - cold therapy  Edema - vasopneumatics  Impaired gait - gait training  Impaired muscle performance - neuro re-education  Decreased function - therapeutic activities    Therapy Evaluation Codes:   1) History comprised of:   Personal factors that impact the plan of care:      None.    Comorbidity factors that impact the plan of care are:      None.     Medications impacting care: None.  2) Examination of Body Systems comprised of:   Body structures and functions that impact the plan of care:      Shoulder.   Activity limitations that impact the plan of care are:      Lifting, Sports and Sleeping.  3) Clinical presentation characteristics are:   Stable/Uncomplicated.  4) Decision-Making    Low complexity using standardized patient assessment instrument and/or measureable assessment of functional outcome.  Cumulative Therapy Evaluation is: Low complexity.    Previous and current functional limitations:  (See Goal Flow Sheet for this information)    Short term and Long term goals: (See Goal Flow Sheet for this information)     Communication ability:  Patient appears to be able to clearly communicate and understand verbal and written communication and follow directions correctly.  Treatment Explanation - The following has been discussed with the patient:   RX ordered/plan of care  Anticipated outcomes  Possible risks and side effects  This patient would benefit from PT  intervention to resume normal activities.   Rehab potential is good.    Frequency:  2 X a month, once daily  Duration:  for 3 months  Discharge Plan:  Achieve all LTG.  Independent in home treatment program.  Reach maximal therapeutic benefit.    Please refer to the daily flowsheet for treatment today, total treatment time and time spent performing 1:1 timed codes.

## 2023-01-10 ENCOUNTER — THERAPY VISIT (OUTPATIENT)
Dept: PHYSICAL THERAPY | Facility: CLINIC | Age: 33
End: 2023-01-10
Payer: COMMERCIAL

## 2023-01-10 DIAGNOSIS — M76.60 ACHILLES TENDON PAIN: ICD-10-CM

## 2023-01-10 DIAGNOSIS — M25.511 SHOULDER PAIN, RIGHT: Primary | ICD-10-CM

## 2023-01-10 PROCEDURE — 97110 THERAPEUTIC EXERCISES: CPT | Mod: GP | Performed by: PHYSICAL THERAPIST

## 2023-01-10 PROCEDURE — 97530 THERAPEUTIC ACTIVITIES: CPT | Mod: GP | Performed by: PHYSICAL THERAPIST

## 2023-01-10 PROCEDURE — 97140 MANUAL THERAPY 1/> REGIONS: CPT | Mod: GP | Performed by: PHYSICAL THERAPIST

## 2023-02-23 ASSESSMENT — PATIENT HEALTH QUESTIONNAIRE - PHQ9
SUM OF ALL RESPONSES TO PHQ QUESTIONS 1-9: 15
10. IF YOU CHECKED OFF ANY PROBLEMS, HOW DIFFICULT HAVE THESE PROBLEMS MADE IT FOR YOU TO DO YOUR WORK, TAKE CARE OF THINGS AT HOME, OR GET ALONG WITH OTHER PEOPLE: VERY DIFFICULT
SUM OF ALL RESPONSES TO PHQ QUESTIONS 1-9: 15

## 2023-02-27 ENCOUNTER — VIRTUAL VISIT (OUTPATIENT)
Dept: FAMILY MEDICINE | Facility: CLINIC | Age: 33
End: 2023-02-27
Payer: COMMERCIAL

## 2023-02-27 DIAGNOSIS — T78.2XXD ANAPHYLAXIS, SUBSEQUENT ENCOUNTER: ICD-10-CM

## 2023-02-27 DIAGNOSIS — F32.9 CURRENT EPISODE OF MAJOR DEPRESSIVE DISORDER WITHOUT PRIOR EPISODE, UNSPECIFIED DEPRESSION EPISODE SEVERITY: Primary | ICD-10-CM

## 2023-02-27 PROCEDURE — 99214 OFFICE O/P EST MOD 30 MIN: CPT | Mod: VID | Performed by: INTERNAL MEDICINE

## 2023-02-27 RX ORDER — DULOXETIN HYDROCHLORIDE 30 MG/1
CAPSULE, DELAYED RELEASE ORAL
Qty: 90 CAPSULE | Refills: 3 | Status: SHIPPED | OUTPATIENT
Start: 2023-02-27 | End: 2023-03-13

## 2023-02-27 RX ORDER — EPINEPHRINE 0.3 MG/.3ML
0.3 INJECTION SUBCUTANEOUS PRN
Qty: 2 EACH | Refills: 11 | Status: SHIPPED | OUTPATIENT
Start: 2023-02-27

## 2023-02-27 ASSESSMENT — PATIENT HEALTH QUESTIONNAIRE - PHQ9
SUM OF ALL RESPONSES TO PHQ QUESTIONS 1-9: 15
10. IF YOU CHECKED OFF ANY PROBLEMS, HOW DIFFICULT HAVE THESE PROBLEMS MADE IT FOR YOU TO DO YOUR WORK, TAKE CARE OF THINGS AT HOME, OR GET ALONG WITH OTHER PEOPLE: VERY DIFFICULT

## 2023-02-27 NOTE — PROGRESS NOTES
Leonila is a 32 year old who is being evaluated via a billable video visit.      How would you like to obtain your AVS? MyChart  If the video visit is dropped, the invitation should be resent by: DOX Text to cell phone: [phone]  Will anyone else be joining your video visit? No      PHQ 2/23/2023   PHQ-9 Total Score 15   Q9: Thoughts of better off dead/self-harm past 2 weeks Not at all           Assessment & Plan     Current episode of major depressive disorder without prior episode, unspecified depression episode severity  Discussed options  He would like to start drug therapy and counseling  Side effects and risks of duloxetine were discussed in detail  Follow up in 3-4 weeks to assess therapy   - DULoxetine (CYMBALTA) 30 MG capsule; One capsule once daily for one week, then increase to two capsules daily  - Adult Mental Health  Referral; Future    Anaphylaxis, subsequent encounter    - EPINEPHrine (ANY BX GENERIC EQUIV) 0.3 MG/0.3ML injection 2-pack; Inject 0.3 mLs (0.3 mg) into the muscle as needed for anaphylaxis May repeat one time in 5-15 minutes if response to initial dose is inadequate.      31 minutes spent on the date of the encounter doing chart review, history and exam, documentation and further activities per the note             Return in about 1 month (around 3/27/2023) for virtual mood recheck in 3-5 weeks .  Patient instructed to return to clinic or contact us sooner if symptoms worsen or new symptoms develop.     Sergey Hardin MD  [facility] NELL Keen is a 32 year old, presenting for the following health issues:  No chief complaint on file.      History of Present Illness       Reason for visit:  Follow up    He eats 2-3 servings of fruits and vegetables daily.He consumes 0 sweetened beverage(s) daily.He exercises with enough effort to increase his heart rate 30 to 60 minutes per day.  He exercises with enough effort to increase his heart rate 4 days per  week.   He is taking medications regularly.    Today's PHQ-9         PHQ-9 Total Score: 15    PHQ-9 Q9 Thoughts of better off dead/self-harm past 2 weeks :   Not at all    How difficult have these problems made it for you to do your work, take care of things at home, or get along with other people: Very difficult         Depressed mood > 14 days with anhedonia, fatigue  No improvement with exercise and full spectrum lamp  Treated with prozac in the remote past without success     Review of Systems         Objective           Vitals:  No vitals were obtained today due to virtual visit.    Physical Exam   GENERAL: Healthy, alert and no distress  EYES: Eyes grossly normal to inspection.  No discharge or erythema, or obvious scleral/conjunctival abnormalities.  RESP: No audible wheeze, cough, or visible cyanosis.  No visible retractions or increased work of breathing.    SKIN: Visible skin clear. No significant rash, abnormal pigmentation or lesions.  NEURO: Cranial nerves grossly intact.  Mentation and speech appropriate for age.  PSYCH: mentation appears normal, mood depressed, no voiced suicidal ideation or plan, affect flat and appearance well groomed                Video-Visit Details    Type of service:  Video Visit     Originating Location (pt. Location): Home  Distant Location (provider location):  On-site  Platform used for Video Visit: [employer]

## 2023-03-11 ENCOUNTER — MYC MEDICAL ADVICE (OUTPATIENT)
Dept: FAMILY MEDICINE | Facility: CLINIC | Age: 33
End: 2023-03-11
Payer: COMMERCIAL

## 2023-03-11 DIAGNOSIS — F32.9 CURRENT EPISODE OF MAJOR DEPRESSIVE DISORDER WITHOUT PRIOR EPISODE, UNSPECIFIED DEPRESSION EPISODE SEVERITY: Primary | ICD-10-CM

## 2023-03-13 RX ORDER — SERTRALINE HYDROCHLORIDE 25 MG/1
TABLET, FILM COATED ORAL
Qty: 90 TABLET | Refills: 3 | Status: SHIPPED | OUTPATIENT
Start: 2023-03-13

## 2023-03-17 ENCOUNTER — THERAPY VISIT (OUTPATIENT)
Dept: PHYSICAL THERAPY | Facility: CLINIC | Age: 33
End: 2023-03-17
Payer: COMMERCIAL

## 2023-03-17 DIAGNOSIS — M25.511 SHOULDER PAIN, RIGHT: Primary | ICD-10-CM

## 2023-03-17 DIAGNOSIS — M76.60 ACHILLES TENDON PAIN: ICD-10-CM

## 2023-03-17 PROCEDURE — 97112 NEUROMUSCULAR REEDUCATION: CPT | Mod: GP | Performed by: PHYSICAL THERAPIST

## 2023-03-17 PROCEDURE — 97110 THERAPEUTIC EXERCISES: CPT | Mod: GP | Performed by: PHYSICAL THERAPIST

## 2023-03-17 PROCEDURE — 97530 THERAPEUTIC ACTIVITIES: CPT | Mod: GP | Performed by: PHYSICAL THERAPIST

## 2023-03-17 NOTE — PROGRESS NOTES
PROGRESS  REPORT    Progress reporting period is from 9/20/22 to 3/17/23.       SUBJECTIVE   Subjective: Patient reports that unfortunately his achilles and shoulder pain remain the same.  He went skiing in [place] last week and his achilles pain flared up.    Changes in function:  Yes (See Goal flowsheet attached for changes in current functional level)  Adverse reaction to treatment or activity: None    OBJECTIVE  Changes noted in objective findings:  Yes  Objective: Ankle ROM WNL all motions, no ttp achilles tendon.  R shoulder AROM WNL all motions. MMT R shoulder 5/5 all motions     ASSESSMENT/PLAN  Updated problem list and treatment plan: Diagnosis 1:  Shoulder pain, achilles pain  STG/LTGs have been met or progress has been made towards goals:  Yes (See Goal flow sheet completed today.)  Assessment of Progress: The patient's progress has plateaued.  Self Management Plans:  Patient has been instructed in a home treatment program.  I have re-evaluated this patient and find that the nature, scope, duration and intensity of the therapy is appropriate for the medical condition of the patient.  Praneeth continues to require the following intervention to meet STG and LTG's:  PT    Recommendations:  This patient would benefit from further evaluation.    Please refer to the daily flowsheet for treatment today, total treatment time and time spent performing 1:1 timed codes.

## 2023-03-20 ENCOUNTER — MYC MEDICAL ADVICE (OUTPATIENT)
Dept: ORTHOPEDICS | Facility: CLINIC | Age: 33
End: 2023-03-20
Payer: COMMERCIAL

## 2023-03-20 DIAGNOSIS — M76.60 ACHILLES TENDON PAIN: Primary | ICD-10-CM

## 2023-03-29 ENCOUNTER — ANCILLARY PROCEDURE (OUTPATIENT)
Dept: MRI IMAGING | Facility: CLINIC | Age: 33
End: 2023-03-29
Attending: FAMILY MEDICINE
Payer: COMMERCIAL

## 2023-03-29 PROCEDURE — 73721 MRI JNT OF LWR EXTRE W/O DYE: CPT | Mod: RT | Performed by: RADIOLOGY

## 2023-04-03 DIAGNOSIS — Z72.51 HIGH RISK SEXUAL BEHAVIOR, UNSPECIFIED TYPE: ICD-10-CM

## 2023-04-04 ENCOUNTER — MYC MEDICAL ADVICE (OUTPATIENT)
Dept: FAMILY MEDICINE | Facility: CLINIC | Age: 33
End: 2023-04-04
Payer: COMMERCIAL

## 2023-04-04 DIAGNOSIS — Z72.51 HIGH RISK SEXUAL BEHAVIOR, UNSPECIFIED TYPE: ICD-10-CM

## 2023-04-04 RX ORDER — EMTRICITABINE AND TENOFOVIR DISOPROXIL FUMARATE 200; 300 MG/1; MG/1
1 TABLET, FILM COATED ORAL DAILY
Qty: 90 TABLET | Refills: 1 | Status: SHIPPED | OUTPATIENT
Start: 2023-04-04

## 2023-04-04 RX ORDER — EMTRICITABINE AND TENOFOVIR DISOPROXIL FUMARATE 200; 300 MG/1; MG/1
TABLET, FILM COATED ORAL
Qty: 90 TABLET | Refills: 0 | Status: SHIPPED | OUTPATIENT
Start: 2023-04-04

## 2023-04-07 ENCOUNTER — VIRTUAL VISIT (OUTPATIENT)
Dept: ORTHOPEDICS | Facility: CLINIC | Age: 33
End: 2023-04-07
Payer: COMMERCIAL

## 2023-04-07 DIAGNOSIS — M76.60 ACHILLES TENDON PAIN: Primary | ICD-10-CM

## 2023-04-07 PROCEDURE — 99214 OFFICE O/P EST MOD 30 MIN: CPT | Mod: VID | Performed by: FAMILY MEDICINE

## 2023-04-07 NOTE — LETTER
4/7/2023         RE: Praneeth Orozco  [address]  [facility] [address]        Dear Colleague,    Thank you for referring your patient, Praneeth Orozco, to the [facility] SPORTS MEDICINE [facility]. Please see a copy of my visit note below.      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  No discrete tearing or structural lesion seen on recent MRI    Reviewed imaging and assessment with patient in detail  We discussed option for treatment at this time which could include trial of topical nitroglycerin patches.  This was discussed with pharmacist regarding dosing and out of administration/preparation and a prescription for a 0.1 mg/h patch was provided.  This is to be placed at the site of pain daily.  Given prescription for 3 months.  Should rotate patch around tendon for best result.  We also discussed more interventional options including injection versus Tenex procedure.  He would be interested in learning more about these options as well.  We will be assisting him in setting up a consult with one of our physicians who performs this procedure    Evgeny Frazier MD  [facility] SPORTS MEDICINE CLINIC [place]    Leonila is a 33 year old who is being evaluated via a billable video visit.      How would you like to obtain your AVS? MyChart  If the video visit is dropped, the invitation should be resent by: Text to cell phone: [phone]  Will anyone else be joining your video visit? No        Video-Visit Details    Type of service:  Video Visit     Originating Location (pt. Location): Other work    Distant Location (provider location):  On-site  Platform used for Video Visit: [employer]    -----  No chief complaint on file.      SUBJECTIVE  Praneeth Orozco is a/an 33 year old male who is seen for follow up of right insertional Achilles  tendinopathy.  Has been able to resume some activity including biking and swimming with minimal discomfort.  However he continues to have pain whenever he tries to return after only a short period of time.  Has been very diligent with physical therapy and home exercises which have been slightly helpful.  However he is becoming very frustrated as he is unable to run without pain and has not been able to do so for the past 14 months.          REVIEW OF SYSTEMS:  See HPI     OBJECTIVE:  There were no vitals taken for this visit.     No exam.  Video visit    RADIOLOGY:    MRI right ankle dated 3/29/2023.  Per radiology report:  Impression:     1. Achilles tendon is intact. Mild tendinosis of the distal portion of  Achilles tendon.   *  Mild retrocalcaneal bursitis.     2. Tendinosis with mild tenosynovitis of the peroneal tendons distal  to the lateral malleolus.        Again, thank you for allowing me to participate in the care of your patient.        Sincerely,        Evgeny Frazier MD

## 2023-04-07 NOTE — LETTER
4/7/2023         RE: Praneeth Orozco  [address]  [facility] [address]        Dear Colleague,    Thank you for referring your patient, Praneeth Orozco, to the [facility] SPORTS MEDICINE [facility]. Please see a copy of my visit note below.      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  No discrete tearing or structural lesion seen on recent MRI    Reviewed imaging and assessment with patient in detail  We discussed option for treatment at this time which could include trial of topical nitroglycerin patches.  This was discussed with pharmacist regarding dosing and out of administration/preparation and a prescription for a 0.1 mg/h patch was provided.  This is to be placed at the site of pain daily.  Given prescription for 3 months.  Should rotate patch around tendon for best result.  We also discussed more interventional options including injection versus Tenex procedure.  He would be interested in learning more about these options as well.  We will be assisting him in setting up a consult with one of our physicians who performs this procedure    Evgeny Frazier MD  [facility] SPORTS MEDICINE CLINIC [place]    Leonila is a 33 year old who is being evaluated via a billable video visit.      How would you like to obtain your AVS? MyChart  If the video visit is dropped, the invitation should be resent by: Text to cell phone: [phone]  Will anyone else be joining your video visit? No        Video-Visit Details    Type of service:  Video Visit     Originating Location (pt. Location): Other work    Distant Location (provider location):  On-site  Platform used for Video Visit: [employer] [place]    -----  No chief complaint on file.      SUBJECTIVE  Praneeth Orozco is a/an 33 year old male who is seen for follow up of right insertional Achilles  tendinopathy.  Has been able to resume some activity including biking and swimming with minimal discomfort.  However he continues to have pain whenever he tries to return after only a short period of time.  Has been very diligent with physical therapy and home exercises which have been slightly helpful.  However he is becoming very frustrated as he is unable to run without pain and has not been able to do so for the past 14 months.          REVIEW OF SYSTEMS:  See HPI     OBJECTIVE:  There were no vitals taken for this visit.     No exam.  Video visit    RADIOLOGY:    MRI right ankle dated 3/29/2023.  Per radiology report:  Impression:     1. Achilles tendon is intact. Mild tendinosis of the distal portion of  Achilles tendon.   *  Mild retrocalcaneal bursitis.     2. Tendinosis with mild tenosynovitis of the peroneal tendons distal  to the lateral malleolus.        Again, thank you for allowing me to participate in the care of your patient.        Sincerely,        Evgeny Frazier MD

## 2023-04-14 RX ORDER — NITROGLYCERIN 0.1MG/HR
PATCH, TRANSDERMAL 24 HOURS TRANSDERMAL
Qty: 30 PATCH | Refills: 2 | Status: SHIPPED | OUTPATIENT
Start: 2023-04-14 | End: 2023-06-16

## 2023-04-14 NOTE — PROGRESS NOTES
[facility]  SPORTS & ORTHOPEDIC CLINIC VISIT     Apr 7, 2023        ASSESSMENT & PLAN    33-year-old with chronic right insertional Achilles tendinopathy.  No discrete tearing or structural lesion seen on recent MRI    Reviewed imaging and assessment with patient in detail  We discussed option for treatment at this time which could include trial of topical nitroglycerin patches.  This was discussed with pharmacist regarding dosing and out of administration/preparation and a prescription for a 0.1 mg/h patch was provided.  This is to be placed at the site of pain daily.  Given prescription for 3 months.  Should rotate patch around tendon for best result.  We also discussed more interventional options including injection versus Tenex procedure.  He would be interested in learning more about these options as well.  We will be assisting him in setting up a consult with one of our physicians who performs this procedure    Evgeny Frazier MD  [facility] SPORTS MEDICINE CLINIC YNES Keen is a 33 year old who is being evaluated via a billable video visit.      How would you like to obtain your AVS? MyChart  If the video visit is dropped, the invitation should be resent by: Text to cell phone: [phone]  Will anyone else be joining your video visit? No        Video-Visit Details    Type of service:  Video Visit     Originating Location (pt. Location): Other work    Distant Location (provider location):  On-site  Platform used for Video Visit: [facility]    -----  No chief complaint on file.      SUBJECTIVE  Praneeth Orozco is a/an 33 year old male who is seen for follow up of right insertional Achilles tendinopathy.  Has been able to resume some activity including biking and swimming with minimal discomfort.  However he continues to have pain whenever he tries to return after only a short period of time.  Has been very diligent with physical therapy and home exercises which have  been slightly helpful.  However he is becoming very frustrated as he is unable to run without pain and has not been able to do so for the past 14 months.          REVIEW OF SYSTEMS:    See HPI     OBJECTIVE:  There were no vitals taken for this visit.     No exam.  Video visit    RADIOLOGY:    MRI right ankle dated 3/29/2023.  Per radiology report:  Impression:     1. Achilles tendon is intact. Mild tendinosis of the distal portion of  Achilles tendon.   *  Mild retrocalcaneal bursitis.     2. Tendinosis with mild tenosynovitis of the peroneal tendons distal  to the lateral malleolus.

## 2023-04-26 NOTE — TELEPHONE ENCOUNTER
DIAGNOSIS: pain in achilles   APPOINTMENT DATE: 5.3.23 - right   NOTES STATUS DETAILS   OFFICE NOTE from other specialist Internal 8.30.22 Evgeny Frazier MD    8.11.22 Sergey Hardin MD    PT in [facility]   MEDICATION LIST Internal    MRI Internal R ankle; 3.29.23

## 2023-05-03 ENCOUNTER — OFFICE VISIT (OUTPATIENT)
Dept: ORTHOPEDICS | Facility: CLINIC | Age: 33
End: 2023-05-03
Payer: COMMERCIAL

## 2023-05-03 ENCOUNTER — PRE VISIT (OUTPATIENT)
Dept: ORTHOPEDICS | Facility: CLINIC | Age: 33
End: 2023-05-03

## 2023-05-03 DIAGNOSIS — M67.971 ACHILLES TENDON DISORDER, RIGHT: Primary | ICD-10-CM

## 2023-05-03 PROCEDURE — 99214 OFFICE O/P EST MOD 30 MIN: CPT | Performed by: FAMILY MEDICINE

## 2023-05-03 NOTE — PROGRESS NOTES
CHIEF COMPLAINT:  Pain of the Right Ankle     HISTORY OF PRESENT ILLNESS  Mr. Orozco is a pleasant 33 year old year old male who presents to clinic today with right ankle pain.  Praneeht explains that he has pain over the achilles insertion. This has been present for the past 14 months. Patient has been unable to exercise due to pain. He has attempted physical therapy, topical nitroglycerin patches, and heel cups.     Onset: gradual  Location: right ankle  Quality:  aching  Duration: 14 months  Severity: 3/10 at worst  Timing:intermittent episodes; worsening when exercising  Modifying factors:  resting and non-use makes it better, movement and use makes it worse  Associated signs & symptoms: pain  Previous similar pain: No  Treatments to date: physical therapy, topical nitroglycerin patches, icing, stretching, boot use, and heel cups.     Additional history: as documented    Review of Systems:    Have you recently had a a fever, chills, weight loss? No    Do you have any vision problems? No    Do you have any chest pain or edema? No    Do you have any shortness of breath or wheezing?  No    Do you have stomach problems? No    Do you have any numbness or focal weakness? No    Do you have diabetes? No    Do you have problems with bleeding or clotting? No    Do you have an rashes or other skin lesions? No    MEDICAL HISTORY  There is no problem list on file for this patient.      Current Outpatient Medications   Medication Sig Dispense Refill     emtricitabine-tenofovir (TRUVADA) 200-300 MG per tablet TAKE ONE TABLET BY MOUTH ONCE DAILY. 90 tablet 0     emtricitabine-tenofovir (TRUVADA) 200-300 MG per tablet Take 1 tablet by mouth daily 90 tablet 1     EPINEPHrine (ANY BX GENERIC EQUIV) 0.3 MG/0.3ML injection 2-pack Inject 0.3 mLs (0.3 mg) into the muscle as needed for anaphylaxis May repeat one time in 5-15 minutes if response to initial dose is inadequate. 2 each 11     meloxicam (MOBIC) 7.5 MG tablet Take 1  tablet (7.5 mg) by mouth daily as needed 90 tablet 1     nitroGLYcerin (NITRODUR) 0.1 MG/HR 24 hr patch Apply 1 patch to heel daily for 90 days 30 patch 2     omeprazole (PRILOSEC) 20 MG DR capsule Take 1 capsule (20 mg) by mouth daily 90 capsule 0     sertraline (ZOLOFT) 25 MG tablet Take one-half tablet once daily for one week, then increase to one tablet daily 90 tablet 3       Allergies   Allergen Reactions     Fish Oil Anaphylaxis     Nuts Anaphylaxis       Family History   Problem Relation Age of Onset     Colon Cancer Maternal Grandfather        Additional medical/Social/Surgical histories reviewed in [facility] and updated as appropriate.       PHYSICAL EXAM    General: Alert, pleasant, no distress  Right ankle: warm, well perfused, SILT throughout  Inspection: No swelling ecchymosis or deformity  ROM: Symmetric intact without pain  Strength: Intact without pain  Palpation: TTP over the distal one third of the Achilles including at the insertion.  No TTP of the remainder of the Achilles tendon or musculocutaneous junction.  No tenderness in the retrocalcaneal area  Special Tests: None    IMAGING :   MR ANKLE RIGHT W/O CONTRAST  Impression:     1. Achilles tendon is intact. Mild tendinosis of the distal portion of  Achilles tendon.   *  Mild retrocalcaneal bursitis.     2. Tendinosis with mild tenosynovitis of the peroneal tendons distal  to the lateral malleolus.     I have personally reviewed the examination and initial interpretation  and I agree with the findings.     CARMITA WILL MD (Joe)        ASSESSMENT & PLAN  Mr. Orozco is a 33 year old year old male presenting for discussion of refractory achilles tendon pain of right ankle.    Lacking response to rest, heel lift, PT, nitroglycerin patch over the past 14 months.    Diagnosis:   1. Achilles tendinopathy of right ankle    Risk benefits and alternatives of percutaneous tenotomy discussed today.  We also considered platelet rich plasma treatment  which would be an out-of-pocket expense.  We discussed consideration for ongoing home exercise program and rest.  Given lack of improvement to date and exhaustion of conservative measures, we agreed to proceed with percutaneous tenotomy, Tenex procedure.  We discussed use of a cam boot for that week following as well as reengagement with physical therapy as best means for successful outcomes.    Additional information provided for Tenex in his after visit summary.    We will place case request, discussed with our surgical scheduler, and tentatively plan for June 16.    It was a pleasure seeing Praneeth today.    Mark Solitario DO, CAQSM  Primary Care Sports Medicine

## 2023-05-03 NOTE — LETTER
5/3/2023      RE: Praneeth Orozco  [address]  [facility] [address]     Dear Colleague,    Thank you for referring your patient, Praneeth Orozco, to the [facility] SPORTS MEDICINE CL[facility] [place]. Please see a copy of my visit note below.    CHIEF COMPLAINT:  Pain of the Right Ankle     HISTORY OF PRESENT ILLNESS  Mr. Orozco is a pleasant 33 year old year old male who presents to clinic today with right ankle pain.  Praneeth explains that he has pain over the achilles insertion. This has been present for the past 14 months. Patient has been unable to exercise due to pain. He has attempted physical therapy, topical nitroglycerin patches, and heel cups.     Onset: gradual  Location: right ankle  Quality:  aching  Duration: 14 months  Severity: 3/10 at worst  Timing:intermittent episodes; worsening when exercising  Modifying factors:  resting and non-use makes it better, movement and use makes it worse  Associated signs & symptoms: pain  Previous similar pain: No  Treatments to date: physical therapy, topical nitroglycerin patches, icing, stretching, boot use, and heel cups.     Additional history: as documented    Review of Systems:  Have you recently had a a fever, chills, weight loss? No  Do you have any vision problems? No  Do you have any chest pain or edema? No  Do you have any shortness of breath or wheezing?  No  Do you have stomach problems? No  Do you have any numbness or focal weakness? No  Do you have diabetes? No  Do you have problems with bleeding or clotting? No  Do you have an rashes or other skin lesions? No    MEDICAL HISTORY  There is no problem list on file for this patient.      Current Outpatient Medications   Medication Sig Dispense Refill    emtricitabine-tenofovir (TRUVADA) 200-300 MG per tablet TAKE ONE TABLET BY MOUTH ONCE DAILY. 90 tablet 0    emtricitabine-tenofovir (TRUVADA) 200-300 MG per tablet Take 1 tablet by mouth daily 90 tablet 1    EPINEPHrine  (ANY BX GENERIC EQUIV) 0.3 MG/0.3ML injection 2-pack Inject 0.3 mLs (0.3 mg) into the muscle as needed for anaphylaxis May repeat one time in 5-15 minutes if response to initial dose is inadequate. 2 each 11    meloxicam (MOBIC) 7.5 MG tablet Take 1 tablet (7.5 mg) by mouth daily as needed 90 tablet 1    nitroGLYcerin (NITRODUR) 0.1 MG/HR 24 hr patch Apply 1 patch to heel daily for 90 days 30 patch 2    omeprazole (PRILOSEC) 20 MG DR capsule Take 1 capsule (20 mg) by mouth daily 90 capsule 0    sertraline (ZOLOFT) 25 MG tablet Take one-half tablet once daily for one week, then increase to one tablet daily 90 tablet 3       Allergies   Allergen Reactions    Fish Oil Anaphylaxis    Nuts Anaphylaxis       Family History   Problem Relation Age of Onset    Colon Cancer Maternal Grandfather        Additional medical/Social/Surgical histories reviewed in [facility] and updated as appropriate.       PHYSICAL EXAM    General: Alert, pleasant, no distress  Right ankle: warm, well perfused, SILT throughout  Inspection: No swelling ecchymosis or deformity  ROM: Symmetric intact without pain  Strength: Intact without pain  Palpation: TTP over the distal one third of the Achilles including at the insertion.  No TTP of the remainder of the Achilles tendon or musculocutaneous junction.  No tenderness in the retrocalcaneal area  Special Tests: None    IMAGING :   MR ANKLE RIGHT W/O CONTRAST  Impression:     1. Achilles tendon is intact. Mild tendinosis of the distal portion of  Achilles tendon.   *  Mild retrocalcaneal bursitis.     2. Tendinosis with mild tenosynovitis of the peroneal tendons distal  to the lateral malleolus.     I have personally reviewed the examination and initial interpretation  and I agree with the findings.     CARMITA WILL MD (Joe)        ASSESSMENT & PLAN  Mr. Orozco is a 33 year old year old male presenting for discussion of refractory achilles tendon pain of right ankle.    Lacking response to rest,  heel lift, PT, nitroglycerin patch over the past 14 months.    Diagnosis:   1. Achilles tendinopathy of right ankle    Risk benefits and alternatives of percutaneous tenotomy discussed today.  We also considered platelet rich plasma treatment which would be an out-of-pocket expense.  We discussed consideration for ongoing home exercise program and rest.  Given lack of improvement to date and exhaustion of conservative measures, we agreed to proceed with percutaneous tenotomy, Tenex procedure.  We discussed use of a cam boot for that week following as well as reengagement with physical therapy as best means for successful outcomes.    Additional information provided for Tenex in his after visit summary.    We will place case request, discussed with our surgical scheduler, and tentatively plan for June 16.    It was a pleasure seeing Praneeth today.    Mark Solitario DO, [facility]  Primary Care Sports Medicine      Again, thank you for allowing me to participate in the care of your patient.      Sincerely,    Mark Solitario DO

## 2023-05-12 ENCOUNTER — MYC MEDICAL ADVICE (OUTPATIENT)
Dept: ORTHOPEDICS | Facility: CLINIC | Age: 33
End: 2023-05-12
Payer: COMMERCIAL

## 2023-05-30 DIAGNOSIS — Z72.51 HIGH RISK SEXUAL BEHAVIOR, UNSPECIFIED TYPE: ICD-10-CM

## 2023-05-31 RX ORDER — EMTRICITABINE AND TENOFOVIR DISOPROXIL FUMARATE 200; 300 MG/1; MG/1
TABLET, FILM COATED ORAL
Qty: 90 TABLET | Refills: 0 | Status: SHIPPED | OUTPATIENT
Start: 2023-05-31

## 2023-06-06 ENCOUNTER — MYC MEDICAL ADVICE (OUTPATIENT)
Dept: FAMILY MEDICINE | Facility: CLINIC | Age: 33
End: 2023-06-06
Payer: COMMERCIAL

## 2023-06-06 DIAGNOSIS — F32.9 CURRENT EPISODE OF MAJOR DEPRESSIVE DISORDER WITHOUT PRIOR EPISODE, UNSPECIFIED DEPRESSION EPISODE SEVERITY: Primary | ICD-10-CM

## 2023-06-16 ENCOUNTER — HOSPITAL ENCOUNTER (OUTPATIENT)
Facility: AMBULATORY SURGERY CENTER | Age: 33
Discharge: HOME | End: 2023-06-16
Attending: FAMILY MEDICINE | Admitting: FAMILY MEDICINE
Payer: COMMERCIAL

## 2023-06-16 VITALS
DIASTOLIC BLOOD PRESSURE: 74 MMHG | HEIGHT: 70 IN | RESPIRATION RATE: 16 BRPM | SYSTOLIC BLOOD PRESSURE: 126 MMHG | BODY MASS INDEX: 22.19 KG/M2 | WEIGHT: 155 LBS | TEMPERATURE: 97.5 F | OXYGEN SATURATION: 99 %

## 2023-06-16 DIAGNOSIS — M67.971 ACHILLES TENDON DISORDER, RIGHT: ICD-10-CM

## 2023-06-16 DIAGNOSIS — M67.971 ACHILLES TENDON DISORDER, RIGHT: Primary | ICD-10-CM

## 2023-06-16 PROCEDURE — 27306 INCISION OF THIGH TENDON: CPT | Mod: RT

## 2023-06-16 PROCEDURE — 27306 INCISION OF THIGH TENDON: CPT | Mod: RT | Performed by: FAMILY MEDICINE

## 2023-06-16 RX ORDER — LIDOCAINE HYDROCHLORIDE 10 MG/ML
INJECTION, SOLUTION INFILTRATION; PERINEURAL DAILY PRN
Status: DISCONTINUED | OUTPATIENT
Start: 2023-06-16 | End: 2023-06-16 | Stop reason: HOSPADM

## 2023-06-16 NOTE — OP NOTE
PROCEDURE ENCOUNTER    [facility]  Orthopedics  Mark Solitario, DO  2023     Name: Praneeth Orozco  MRN: [id number]  Age: 33 year old  : 1990    Referring provider: Dr. Evgeny Frazier MD  Diagnosis: Achilles tendinopathy of right ankle    Achilles Tendon Percutaneous Tenotomy Procedure using Tenex System  Patient presented with chronic achilles tendinitis of right ankle.  He presents for a percutaneous tenotomy distal achilles tendon using ultrasound guidance to cut and remove the pathologic tissue.     The anatomy was identified and the diseased tissue was visualized and confirmed at the distal achilles tendon the origin.  The area was prepped with chlorhexidine.  Local anesthesia was provided with a local injection of 7 mL of 1% lidocaine, using a 27 gauge needle.  A #11 blade was used to puncture the skin to the site of the pathologic tissue.  A sterile sleeve was placed over the ultrasound transducer.     To section the tendon the surgical instrument is introduced through the puncture site advancing to the diseased tendon.  Once the tip of the instrument is confirmed to be on the pathologic tissue, the foot pedal was depressed and the tendon is incised along its length, cutting and removing the diseased tendon tissue.  Procedure was performed with a total of 1 min and 45 seconds of cutting time.     Following the procedure, steri-strips were placed, followed by Tegaderm.  Post-operative instructions were given as follows:     Achilles Tendon Percutaneous Tenotomy Procedure - After Care Instructions    CAM Boot and crutches given    Replace bandage at least once per day for first 7 days    Keep bandages and procedure area clean and dry.    Do not bathe (submerge knee in water) knee for 1 week, showering is ok.    If you experience discomfort in the first few days after the procedure, you may use application of an ice pack over the ankle. Keep in place for 20 minutes and then remove for at least 20  minute before reapplying if necessary. You may also use acetaminophen (Tylenol) as directed on container.     If you notice increasing redness, warmth and pain, fever, or drainage from the wound then notify our office at [phone]    Return to this clinic for your follow-up visit in 2 weeks.     Mark Solitario DO CAQSM  Primary Care Sports Medicine  [facility]

## 2023-06-16 NOTE — DISCHARGE INSTRUCTIONS
Achilles tendon or plantar fascia Percutaneous Tenotomy Procedure - After Care Instructions    -Rest foot and ankle today     Walking boot and crutches given. RECOMMEND BOOT FOR ONE WEEK    Keep compression sleeve on leg for 24 hours.     Keep bandages and procedure area clean and dry.     Do not bathe (submerge foot and ankle in water) knee for 1 week, showering is ok.     If you experience discomfort in the first few days after the procedure, you may use application of an ice pack over the knee. Keep in place for 20 minutes and then remove for at least 20 minute before reapplying if necessary. You may also use acetaminophen (Tylenol) as directed on container.     If you notice increasing redness, warmth and pain, fever, or drainage from the wound then notify our office at [phone]    - Return to this clinic for your follow-up visit in 2 weeks. CALL OR MYCHART TO SCHEDULE

## 2023-06-28 ENCOUNTER — THERAPY VISIT (OUTPATIENT)
Dept: PHYSICAL THERAPY | Facility: CLINIC | Age: 33
End: 2023-06-28
Payer: COMMERCIAL

## 2023-06-28 DIAGNOSIS — M67.971 ACHILLES TENDON DISORDER, RIGHT: Primary | ICD-10-CM

## 2023-06-28 PROCEDURE — 97530 THERAPEUTIC ACTIVITIES: CPT | Mod: GP | Performed by: PHYSICAL THERAPIST

## 2023-06-28 PROCEDURE — 97110 THERAPEUTIC EXERCISES: CPT | Mod: GP | Performed by: PHYSICAL THERAPIST

## 2023-10-22 ENCOUNTER — HEALTH MAINTENANCE LETTER (OUTPATIENT)
Age: 33
End: 2023-10-22

## 2024-12-15 ENCOUNTER — HEALTH MAINTENANCE LETTER (OUTPATIENT)
Age: 34
End: 2024-12-15

## 2025-05-15 NOTE — PATIENT INSTRUCTIONS
WHAT IS AN ACHILLES TENDON INJURY?      An Achilles tendon injury is a problem with the tendon that connects your heel bone to the calf muscle of your lower leg. Tendons are strong bands of tissue that attach muscle to bone. You use the Achilles tendon when you point your toes up and down and when you walk, run, or jump.    Tendons can be injured suddenly or they may be slowly damaged over time. You can have tiny or partial tears in your tendon. If you have a complete tear of your tendon, it s called a rupture. Other tendon injuries may be called a strain, tendinosis, or tendonitis.    WHAT IS THE CAUSE?    Achilles tendon injuries can be caused by:    Overuse of the tendon, such as from lots of uphill running, intense exercise, or sports training or from doing a lot of work that causes you to bend at the knees and ankles  A sudden activity that twists or tears your tendon, such as jumping, starting to sprint, or falling    You are more likely to have an Achilles tendon problem if you:    Have tight calf muscles or a tight Achilles tendon  Change the type of running shoes you wear, or if you wear high heels most of the day and then switch to lower heeled shoes for exercise  Have a problem called over-pronation, which happens when your feet roll inward and flatten out more than normal when you walk or run  WHAT ARE THE SYMPTOMS?    Symptoms may include:    Pain, stiffness, weakness, or swelling in the back of your lower leg  Pain in the back of your leg or ankle when you rise up on your toes  Trouble moving your ankle in different directions  If the tendon is completely torn, you may have felt a pop at the time of the injury. You may not be able to lift your heel off the ground or point your toes.    HOW IS IT DIAGNOSED?    Your healthcare provider will ask about your symptoms, activities, and medical history and examine you. Your provider may ask to watch you walk or run to see if your feet flatten more than normal.  You may have tests such as X-rays or other scans.    HOW IS IT TREATED?    You will need to change or stop doing the activities that cause pain until your tendon has healed. For example, you may need to swim instead of run.    Your healthcare provider may recommend stretching and strengthening exercises to help you heal.    Special shoes or shoe inserts may help. If you have a severe injury, your healthcare provider may put your foot in a cast or splint for several weeks to keep it from moving while it heals.    If your tendon is torn, you may need surgery to repair the tendon.    The pain often gets better within a few weeks with self-care, but some injuries may take several months or longer to heal. It s important to follow all of your healthcare provider s instructions.    HOW CAN I TAKE CARE OF MYSELF?    To keep swelling down and help relieve pain:    Put an ice pack, gel pack, or package of frozen vegetables wrapped in a cloth on the injured area every 3 to 4 hours for up to 20 minutes at a time.  Do ice massage. To do this, freeze water in a Styrofoam cup, then peel the top of the cup away to expose the ice. Hold the bottom of the cup and rub the ice over the painful area for 5 to 10 minutes. Do this several times a day while you have pain.  Keep your foot up on pillows when you sit or lie down.    Take nonprescription pain medicine, such as acetaminophen, ibuprofen, or naproxen. Nonsteroidal anti-inflammatory medicines (NSAIDs), such as ibuprofen and naproxen, may cause stomach bleeding and other problems. These risks increase with age. Read the label and take as directed. Unless recommended by your healthcare provider, you should not take this medicine for more than 10 days.    Moist heat may help relieve pain, relax your muscles, and make it easier to use your ankle. Put moist heat on the injured area for 10 to 15 minutes before you do warm-up and stretching exercises. Moist heat includes heat patches or  moist heating pads that you can buy at most drugstores, a warm, wet washcloth, or a hot shower. To prevent burns to your skin, follow directions on the package and do not lie on any type of hot pad. Don t use heat if you have swelling.    HOW CAN I HELP PREVENT AN ACHILLES TENDON PROBLEM?     Warm-up exercises and stretching before activities can help prevent injuries. If you have tight Achilles tendons or calf muscles, stretch them twice a day whether or not you are doing any activities that day. If your leg or ankle hurts after exercise, putting ice on it may help keep it from getting injured.    Avoid running uphill if you tend to have Achilles tendon injuries.    Follow the safety rules and use the protective equipment recommended for your work or sport.    Achilles Tendonitis Exercises    You can do the towel stretch right away. When the towel stretch is easy, try the standing calf stretch, soleus stretch, and leg lift. When you no longer have sharp pain in your calf or tendon, you can do the step-up, heel raises, and static and balance and reach exercises.    Towel stretch: Sit on a hard surface with your injured leg stretched out in front of you. Loop a towel around your toes and the ball of your foot and pull the towel toward your body keeping your leg straight. Hold this position for 15 to 30 seconds and then relax. Repeat 3 times.    Standing calf stretch: Stand facing a wall with your hands on the wall at about eye level. Keep your injured leg back with your heel on the floor. Keep the other leg forward with the knee bent. Turn your back foot slightly inward (as if you were pigeon-toed). Slowly lean into the wall until you feel a stretch in the back of your calf. Hold the stretch for 15 to 30 seconds. Return to the starting position. Repeat 3 times. Do this exercise several times each day.    Standing soleus stretch: Stand facing a wall with your hands on the wall at about chest height. Keep your injured  leg back with your heel on the floor. Keep the other leg forward with the knee bent. Turn your back foot slightly inward (as if you were pigeon-toed). Bend your back knee slightly and gently lean into the wall until you feel a stretch in the lower calf of your injured leg. Hold the stretch for 15 to 30 seconds. Return to the starting position. Repeat 3 times.    Side-lying leg lift: Lie on your uninjured side. Tighten the front thigh muscles on your injured leg and lift that leg 8 to 10 inches (20 to 25 centimeters) away from the other leg. Keep the leg straight and lower it slowly. Do 2 sets of 15.    Step-up: Stand with the foot of your injured leg on a support 3 to 5 inches (8 to 13 centimeters) high --like a small step or block of wood. Keep your other foot flat on the floor. Shift your weight onto the injured leg on the support. Straighten your injured leg as the other leg comes off the floor. Return to the starting position by bending your injured leg and slowly lowering your uninjured leg back to the floor. Do 2 sets of 15.    Eccentric calf strengthening: Stand behind a chair or counter with your feet flat on the floor. Using the chair or counter as a support to help you, raise your body up onto your toes and hold for 5 seconds. Then slowly lower yourself down with your injured leg only. (It's OK to keep holding onto the support if you need to.) Repeat 15 times. Do 2 sets of 15. Rest 30 seconds between sets.    Single leg balance exercises: Stand next to a chair with your injured leg farther from the chair. The chair will provide support if you need it. Stand on the foot of your injured leg and bend your knee slightly. Try to raise the arch of this foot while keeping your big toe on the floor. Keep your foot in this position.    While keeping your arch raised, reach the hand that is farther away from the chair across your body toward the chair. The farther you reach, the more challenging the exercise. Do 2  sets of 15.    Published by [employer].  Copyright  2014 [employer] and/or one of its subsidiaries. All rights reserved.         2666

## (undated) DEVICE — GOWN LG DISP 9515

## (undated) DEVICE — SOL NACL 0.9% INJ 1000ML BAG 2B1324X

## (undated) DEVICE — PEN MARKING SKIN W/LABELS 31145918

## (undated) DEVICE — LINEN TOWEL PACK X5 5464

## (undated) DEVICE — PAD CHUX UNDERPAD 30X30"

## (undated) DEVICE — CAST STOCKINETTE 4" COTTON 30-7004

## (undated) DEVICE — DRAPE SHEET MED 44X70" 9355

## (undated) DEVICE — DRSG GAUZE 4X4" 3033

## (undated) DEVICE — SOL ADH LIQUID BENZOIN SWAB 0.6ML C1544

## (undated) DEVICE — PREP CHLORAPREP 26ML TINTED ORANGE  260815

## (undated) DEVICE — COVER SHOE HI-TOP FLUID RESISTANT

## (undated) DEVICE — GLOVE PROTEXIS BLUE W/NEU-THERA 7.5  2D73EB75

## (undated) DEVICE — GLOVE PROTEXIS BLUE W/NEU-THERA 8.0  2D73EB80

## (undated) DEVICE — GEL ULTRASOUND AQUASONIC 20GM 01-01

## (undated) DEVICE — Device

## (undated) RX ORDER — LIDOCAINE HYDROCHLORIDE 10 MG/ML
INJECTION, SOLUTION EPIDURAL; INFILTRATION; INTRACAUDAL; PERINEURAL
Status: DISPENSED
Start: 2022-11-15

## (undated) RX ORDER — TRIAMCINOLONE ACETONIDE 40 MG/ML
INJECTION, SUSPENSION INTRA-ARTICULAR; INTRAMUSCULAR
Status: DISPENSED
Start: 2022-11-15